# Patient Record
Sex: FEMALE | Race: WHITE
[De-identification: names, ages, dates, MRNs, and addresses within clinical notes are randomized per-mention and may not be internally consistent; named-entity substitution may affect disease eponyms.]

---

## 2019-04-23 ENCOUNTER — HOSPITAL ENCOUNTER (EMERGENCY)
Dept: HOSPITAL 4 - SED | Age: 54
Discharge: HOME | End: 2019-04-23
Payer: COMMERCIAL

## 2019-04-23 VITALS — BODY MASS INDEX: 18.61 KG/M2 | HEIGHT: 70 IN | WEIGHT: 130 LBS

## 2019-04-23 VITALS — SYSTOLIC BLOOD PRESSURE: 123 MMHG

## 2019-04-23 VITALS — SYSTOLIC BLOOD PRESSURE: 126 MMHG

## 2019-04-23 DIAGNOSIS — J32.9: Primary | ICD-10-CM

## 2019-04-23 DIAGNOSIS — Z90.89: ICD-10-CM

## 2019-04-23 DIAGNOSIS — N39.0: ICD-10-CM

## 2019-04-23 DIAGNOSIS — R56.9: ICD-10-CM

## 2019-04-23 DIAGNOSIS — Z87.891: ICD-10-CM

## 2019-04-23 DIAGNOSIS — M79.7: ICD-10-CM

## 2019-04-23 LAB
ALBUMIN SERPL BCP-MCNC: 4 G/DL (ref 3.4–4.8)
ALT SERPL W P-5'-P-CCNC: 29 U/L (ref 12–78)
AMPHETAMINES UR QL SCN: NEGATIVE
ANION GAP SERPL CALCULATED.3IONS-SCNC: 9 MMOL/L (ref 5–15)
APPEARANCE UR: CLEAR
AST SERPL W P-5'-P-CCNC: 22 U/L (ref 10–37)
BACTERIA URNS QL MICRO: (no result) /HPF
BARBITURATES UR QL SCN: NEGATIVE
BASOPHILS # BLD AUTO: 0 K/UL (ref 0–0.2)
BASOPHILS NFR BLD AUTO: 0.7 % (ref 0–2)
BENZODIAZ UR QL SCN: NEGATIVE
BILIRUB SERPL-MCNC: 0.5 MG/DL (ref 0–1)
BILIRUB UR QL STRIP: NEGATIVE
BUN SERPL-MCNC: 14 MG/DL (ref 8–21)
BZE UR QL SCN: NEGATIVE
CALCIUM SERPL-MCNC: 9.6 MG/DL (ref 8.4–11)
CANNABINOIDS UR QL SCN: POSITIVE
CHLORIDE SERPL-SCNC: 101 MMOL/L (ref 98–107)
COLOR UR: YELLOW
CREAT SERPL-MCNC: 0.86 MG/DL (ref 0.55–1.3)
EOSINOPHIL # BLD AUTO: 0.1 K/UL (ref 0–0.4)
EOSINOPHIL NFR BLD AUTO: 1.5 % (ref 0–4)
ERYTHROCYTE [DISTWIDTH] IN BLOOD BY AUTOMATED COUNT: 13.2 % (ref 9–15)
ETHANOL SERPL-MCNC: < 3 MG/DL (ref ?–10)
GFR SERPL CREATININE-BSD FRML MDRD: 88 ML/MIN (ref 90–?)
GLUCOSE SERPL-MCNC: 141 MG/DL (ref 70–99)
GLUCOSE UR STRIP-MCNC: NEGATIVE MG/DL
HCT VFR BLD AUTO: 39.9 % (ref 36–48)
HGB BLD-MCNC: 13.4 G/DL (ref 12–16)
HGB UR QL STRIP: NEGATIVE
INR PPP: 1.1 (ref 0.8–1.2)
KETONES UR STRIP-MCNC: NEGATIVE MG/DL
LEUKOCYTE ESTERASE UR QL STRIP: (no result)
LYMPHOCYTES # BLD AUTO: 1.7 K/UL (ref 1–5.5)
LYMPHOCYTES NFR BLD AUTO: 27.1 % (ref 20.5–51.5)
MCH RBC QN AUTO: 31 PG (ref 27–31)
MCHC RBC AUTO-ENTMCNC: 34 % (ref 32–36)
MCV RBC AUTO: 92 FL (ref 79–98)
METHADONE UR-SCNC: NEGATIVE UMOL/L
METHAMPHET UR-SCNC: NEGATIVE UMOL/L
MONOCYTES # BLD MANUAL: 0.5 K/UL (ref 0–1)
MONOCYTES # BLD MANUAL: 7.5 % (ref 1.7–9.3)
NEUTROPHILS # BLD AUTO: 3.9 K/UL (ref 1.8–7.7)
NEUTROPHILS NFR BLD AUTO: 63.2 % (ref 40–70)
NITRITE UR QL STRIP: NEGATIVE
OPIATES UR QL SCN: POSITIVE
OXYCODONE SERPL-MCNC: NEGATIVE NG/ML
PCP UR QL SCN: NEGATIVE
PH UR STRIP: 8 [PH] (ref 5–8)
PLATELET # BLD AUTO: 256 K/UL (ref 130–430)
POTASSIUM SERPL-SCNC: 4.4 MMOL/L (ref 3.5–5.1)
PROT UR QL STRIP: NEGATIVE
PROTHROMBIN TIME: 11.5 SECS (ref 9.5–12.5)
RBC # BLD AUTO: 4.35 MIL/UL (ref 4.2–6.2)
RBC #/AREA URNS HPF: (no result) /HPF (ref 0–3)
SODIUM SERPLBLD-SCNC: 137 MMOL/L (ref 136–145)
SP GR UR STRIP: 1.01 (ref 1–1.03)
TRICYCLICS UR-MCNC: POSITIVE NG/ML
URINE PROPOXYPHENE SCREEN: NEGATIVE
UROBILINOGEN UR STRIP-MCNC: 0.2 MG/DL (ref 0.2–1)
WBC # BLD AUTO: 6.2 K/UL (ref 4.8–10.8)
WBC #/AREA URNS HPF: (no result) /HPF (ref 0–3)

## 2019-04-23 PROCEDURE — 80053 COMPREHEN METABOLIC PANEL: CPT

## 2019-04-23 PROCEDURE — 85730 THROMBOPLASTIN TIME PARTIAL: CPT

## 2019-04-23 PROCEDURE — 85610 PROTHROMBIN TIME: CPT

## 2019-04-23 PROCEDURE — 84484 ASSAY OF TROPONIN QUANT: CPT

## 2019-04-23 PROCEDURE — 99284 EMERGENCY DEPT VISIT MOD MDM: CPT

## 2019-04-23 PROCEDURE — 70450 CT HEAD/BRAIN W/O DYE: CPT

## 2019-04-23 PROCEDURE — 36415 COLL VENOUS BLD VENIPUNCTURE: CPT

## 2019-04-23 PROCEDURE — 81000 URINALYSIS NONAUTO W/SCOPE: CPT

## 2019-04-23 PROCEDURE — 71045 X-RAY EXAM CHEST 1 VIEW: CPT

## 2019-04-23 PROCEDURE — 85025 COMPLETE CBC W/AUTO DIFF WBC: CPT

## 2019-04-23 PROCEDURE — 70551 MRI BRAIN STEM W/O DYE: CPT

## 2019-04-23 PROCEDURE — G0482 DRUG TEST DEF 15-21 CLASSES: HCPCS

## 2019-04-23 PROCEDURE — 87086 URINE CULTURE/COLONY COUNT: CPT

## 2019-04-23 PROCEDURE — 80307 DRUG TEST PRSMV CHEM ANLYZR: CPT

## 2019-04-23 PROCEDURE — 93005 ELECTROCARDIOGRAM TRACING: CPT

## 2019-04-23 NOTE — NUR
Patient to ER bed 4 to gown for evaluation. Place on cardiac monitor. Side 
rails up.

Report given to Wendy PENNY.

## 2019-04-23 NOTE — NUR
Patient given written and verbal discharge instructions and verbalizes 
understanding.  ER MD discussed with patient the results and treatment 
provided. Patient in stable condition. ID arm band removed. IV catheter removed 
intact and dressing applied, no active bleeding.

Rx of Tylenol, Cipro given. Patient educated on pain management and to follow 
up with PMD. Pain Scale 0/10.

Opportunity for questions provided and answered. Medication side effect fact 
sheet provided.

## 2019-04-23 NOTE — NUR
# 18 gauge angiocath placed to Left forearm.  Use of asceptic technique.  
Opsite placed over site.  Blood return noted.  Blood for lab drawn from site.  
Flushed with 10 cc of normal saline.  No evidence of infiltration noted.  
Patient tolerated well.

## 2019-04-23 NOTE — NUR
patient arrived AOx4 from home with c/o near syncope x yesterday. patient 
states when the event happen, she then went to sleep for 5 hours in the middle 
of the day. patient states she remembers hitting her head several times but did 
not come to  ER. patient has no currrent neuro def noted. patient present 
with a stable gait, bilateral strength to all extremities. patient stated she 
has had this happen before about 5 months ago and has not been worked up for 
this problem. no ther complaint or injury at this time.

## 2020-11-14 ENCOUNTER — HOSPITAL ENCOUNTER (INPATIENT)
Dept: HOSPITAL 4 - SED | Age: 55
LOS: 1 days | Discharge: HOME | DRG: 313 | End: 2020-11-15
Attending: INTERNAL MEDICINE | Admitting: INTERNAL MEDICINE
Payer: COMMERCIAL

## 2020-11-14 VITALS — HEIGHT: 70 IN | WEIGHT: 132 LBS | BODY MASS INDEX: 18.9 KG/M2

## 2020-11-14 VITALS — SYSTOLIC BLOOD PRESSURE: 105 MMHG

## 2020-11-14 VITALS — SYSTOLIC BLOOD PRESSURE: 130 MMHG

## 2020-11-14 VITALS — SYSTOLIC BLOOD PRESSURE: 133 MMHG

## 2020-11-14 DIAGNOSIS — Z79.82: ICD-10-CM

## 2020-11-14 DIAGNOSIS — J45.909: ICD-10-CM

## 2020-11-14 DIAGNOSIS — G47.00: ICD-10-CM

## 2020-11-14 DIAGNOSIS — I88.9: ICD-10-CM

## 2020-11-14 DIAGNOSIS — F41.9: ICD-10-CM

## 2020-11-14 DIAGNOSIS — R07.89: Primary | ICD-10-CM

## 2020-11-14 DIAGNOSIS — Z20.828: ICD-10-CM

## 2020-11-14 DIAGNOSIS — J31.0: ICD-10-CM

## 2020-11-14 DIAGNOSIS — Z79.899: ICD-10-CM

## 2020-11-14 DIAGNOSIS — E03.9: ICD-10-CM

## 2020-11-14 DIAGNOSIS — F32.9: ICD-10-CM

## 2020-11-14 LAB
ALBUMIN SERPL BCP-MCNC: 4.2 G/DL (ref 3.4–4.8)
ALT SERPL W P-5'-P-CCNC: 26 U/L (ref 12–78)
ANION GAP SERPL CALCULATED.3IONS-SCNC: 6 MMOL/L (ref 5–15)
AST SERPL W P-5'-P-CCNC: 24 U/L (ref 10–37)
BASOPHILS # BLD AUTO: 0.1 K/UL (ref 0–0.2)
BASOPHILS NFR BLD AUTO: 1.3 % (ref 0–2)
BILIRUB SERPL-MCNC: 0.5 MG/DL (ref 0–1)
BUN SERPL-MCNC: 10 MG/DL (ref 8–21)
CALCIUM SERPL-MCNC: 8.6 MG/DL (ref 8.4–11)
CHLORIDE SERPL-SCNC: 103 MMOL/L (ref 98–107)
CREAT SERPL-MCNC: 0.74 MG/DL (ref 0.55–1.3)
EOSINOPHIL # BLD AUTO: 0.2 K/UL (ref 0–0.4)
EOSINOPHIL NFR BLD AUTO: 2.5 % (ref 0–4)
ERYTHROCYTE [DISTWIDTH] IN BLOOD BY AUTOMATED COUNT: 13.3 % (ref 9–15)
GFR SERPL CREATININE-BSD FRML MDRD: 105 ML/MIN (ref 90–?)
GLUCOSE SERPL-MCNC: 97 MG/DL (ref 70–99)
HCT VFR BLD AUTO: 38.6 % (ref 36–48)
HGB BLD-MCNC: 13 G/DL (ref 12–16)
LIPASE SERPL-CCNC: 744 U/L (ref 73–393)
LYMPHOCYTES # BLD AUTO: 1.9 K/UL (ref 1–5.5)
LYMPHOCYTES NFR BLD AUTO: 26.3 % (ref 20.5–51.5)
MCH RBC QN AUTO: 31 PG (ref 27–31)
MCHC RBC AUTO-ENTMCNC: 34 % (ref 32–36)
MCV RBC AUTO: 91 FL (ref 79–98)
MONOCYTES # BLD MANUAL: 0.6 K/UL (ref 0–1)
MONOCYTES # BLD MANUAL: 7.7 % (ref 1.7–9.3)
NEUTROPHILS # BLD AUTO: 4.5 K/UL (ref 1.8–7.7)
NEUTROPHILS NFR BLD AUTO: 62.2 % (ref 40–70)
PLATELET # BLD AUTO: 284 K/UL (ref 130–430)
POTASSIUM SERPL-SCNC: 5 MMOL/L (ref 3.5–5.1)
RBC # BLD AUTO: 4.23 MIL/UL (ref 4.2–6.2)
SODIUM SERPLBLD-SCNC: 139 MMOL/L (ref 136–145)
WBC # BLD AUTO: 7.3 K/UL (ref 4.8–10.8)

## 2020-11-14 PROCEDURE — G0378 HOSPITAL OBSERVATION PER HR: HCPCS

## 2020-11-14 RX ADMIN — Medication SCH ML: at 21:43

## 2020-11-14 NOTE — NUR
Admission Note

Received patient from ER with diagnosis of chest pain R/O MI. Initial Plan of Care 
discussed-patient verbalized understanding. Family at bedside. Oriented to room, call light, 
pain management and safety.

## 2020-11-14 NOTE — NUR
CONSULTATION PAGED



REASON FOR CONSULTATION:CHEST PAIN R/O MI

WAS CONSULT CALLED?Y

PERSON WHO WAS NOTIFIED:ADRIAN

CONSULTING PHYSICIAN:KASSIDY LUJAN (CHARLENE SPRINGER ON CALL)

CONSULTANT SPECIALTY:CARDIO

CONSULTANT PHONE NUMBER:[phone]

ORDERING PHYSICIAN:AMERICA DAS

## 2020-11-14 NOTE — NUR
Patient came from home for evaluation of chest pain that has been intermittent 
for 2 weeks getting progressively worse over the past 2 days. Patient states 
the pain radiates to her left arm.

## 2020-11-14 NOTE — NUR
closing notes

pt awake, alert, and oriented, eating dinner in bed. brought pt's 2 vape juices, vape, and 
powder in thc container back to  to bring home. nonlabored breathing noted on room 
air. iv line intact and patent, no signs of infiltration noted. no acute distress noted. all 
needs met. call light in reach. fall and aspiration precautions in place. bed locked and in 
lowest position. will endorse to noc nurse including pt's additional belongings brought from

## 2020-11-14 NOTE — NUR
ROUTINE MEDS/ FLU VACCINE

ADMINISTERED ROUTINE MEDS AND FLU VACCINE AS ORDERED PER MD, EDUCATION GIVEN, TOLERATED 
WELL.

## 2020-11-14 NOTE — NUR
Placed in room 3. Placed on cardiac monitor, blood pressure machine and pulse 
oximeter. To gown for exam. Side rails up.

## 2020-11-14 NOTE — NUR
Transfer to [facility] via ACLS protocol. Licensed nurse present. IV present no signs 
or symptoms of infiltration.

## 2020-11-14 NOTE — NUR
Patient will be admitted to care of Dr. Varner.  Admitted to Telemetry unit.  
Will go to room 101B.  Belongings list completed.  Complete and up to date 
summary report printed. SBAR report to be given at bedside with opportunity for 
questions.

## 2020-11-14 NOTE — NUR
RN ROUNDS:



Patient is laying in bed and appears to be asleep.  She is not exhibiting any s/s of 
distress or discomfort.  will continue to monitor patient.

## 2020-11-14 NOTE — NUR
OPENING NOTES:



Received report from dayshift nurse.  Patient is alert and oriented x4.  She is on tele 
monitor and was admitted for chest pain.  Patient declined any pain at this time.  Ensured 
all safety precautions.  Bed is locked and in the lowest position, call light within reach.

## 2020-11-14 NOTE — NUR
MEDICATION ADMINISTRATION:



Patient is laying in bed with no s/s of distress or discomfort.  She is requesting her 
scheduled Ambien to help her sleep.  Medication administered as ordered.  Educated patient 
about the potential side effects of Ambien such as drowsiness and advised her not to get up 
to quickly and call for assistance if she feels drowsy.  Patient verbalized understanding.  
Will continue to monitor.

## 2020-11-15 VITALS — SYSTOLIC BLOOD PRESSURE: 94 MMHG

## 2020-11-15 VITALS — SYSTOLIC BLOOD PRESSURE: 96 MMHG

## 2020-11-15 VITALS — SYSTOLIC BLOOD PRESSURE: 104 MMHG

## 2020-11-15 LAB
ALBUMIN SERPL BCP-MCNC: 3.7 G/DL (ref 3.4–4.8)
ALT SERPL W P-5'-P-CCNC: 21 U/L (ref 12–78)
ANION GAP SERPL CALCULATED.3IONS-SCNC: 8 MMOL/L (ref 5–15)
AST SERPL W P-5'-P-CCNC: 21 U/L (ref 10–37)
BASOPHILS # BLD AUTO: 0 K/UL (ref 0–0.2)
BASOPHILS NFR BLD AUTO: 0.8 % (ref 0–2)
BILIRUB SERPL-MCNC: 0.4 MG/DL (ref 0–1)
BUN SERPL-MCNC: 10 MG/DL (ref 8–21)
CALCIUM SERPL-MCNC: 7.7 MG/DL (ref 8.4–11)
CHLORIDE SERPL-SCNC: 105 MMOL/L (ref 98–107)
CREAT SERPL-MCNC: 0.69 MG/DL (ref 0.55–1.3)
EOSINOPHIL # BLD AUTO: 0.2 K/UL (ref 0–0.4)
EOSINOPHIL NFR BLD AUTO: 3.6 % (ref 0–4)
ERYTHROCYTE [DISTWIDTH] IN BLOOD BY AUTOMATED COUNT: 13.3 % (ref 9–15)
GFR SERPL CREATININE-BSD FRML MDRD: 114 ML/MIN (ref 90–?)
GLUCOSE SERPL-MCNC: 97 MG/DL (ref 70–99)
HCT VFR BLD AUTO: 36.6 % (ref 36–48)
HGB BLD-MCNC: 12.5 G/DL (ref 12–16)
LIPASE SERPL-CCNC: 154 U/L (ref 73–393)
LYMPHOCYTES # BLD AUTO: 1.6 K/UL (ref 1–5.5)
LYMPHOCYTES NFR BLD AUTO: 26.4 % (ref 20.5–51.5)
MCH RBC QN AUTO: 31 PG (ref 27–31)
MCHC RBC AUTO-ENTMCNC: 34 % (ref 32–36)
MCV RBC AUTO: 91 FL (ref 79–98)
MONOCYTES # BLD MANUAL: 0.4 K/UL (ref 0–1)
MONOCYTES # BLD MANUAL: 7.4 % (ref 1.7–9.3)
NEUTROPHILS # BLD AUTO: 3.7 K/UL (ref 1.8–7.7)
NEUTROPHILS NFR BLD AUTO: 61.8 % (ref 40–70)
PLATELET # BLD AUTO: 255 K/UL (ref 130–430)
POTASSIUM SERPL-SCNC: 4 MMOL/L (ref 3.5–5.1)
RBC # BLD AUTO: 4.04 MIL/UL (ref 4.2–6.2)
SODIUM SERPLBLD-SCNC: 141 MMOL/L (ref 136–145)
WBC # BLD AUTO: 6 K/UL (ref 4.8–10.8)

## 2020-11-15 RX ADMIN — Medication SCH ML: at 06:20

## 2020-11-15 NOTE — NUR
CLOSING NOTES:



Patient is laying in bed and is currently asleep.  She is not exhibiting an s/s of distress 
or discomfort.  She did not have an complaints of chest pain throughout shift.  Patient has 
IV on right hand, patent and intact.  All needs were met throughout shift.  Ensured all 
safety precautions.  Bed is in the lowest position and call light within reach.  Will 
endorse to dayshift nurse.

## 2020-11-15 NOTE — NUR
D/C Patient

Patient given medication reconciliation form and D/C instructions. Exit Care provided. 
Patient verbalized understanding. MD discussed with patient the results and treatment 
provided. Ambulatory with steady gait for discharge to home. Patient in stable condition, ID 
band removed. IV catheter removed, intact and dressing applied, no active bleeding. Rx of 
cephalexin given. Patient educated on pain management. All belongings sent with patient. No

## 2020-11-15 NOTE — NUR
MD VISIT/CARDIO

DR JEFFERY AT BED SIDE TALKING TO PT. PT IS CLEAR FOR DISCHARGE AS PER DR JEFFERY. PT TO 
FOLLOW UP WITH DR SHRESTHA AND DR MILLER FOR STRESS TEST AS OUT PT.

## 2020-11-15 NOTE — NUR
RN ROUNDS:



Patient is laying in bed and appears to be asleep.  She is in stable condition.  Will 
continue to monitor.

## 2020-11-15 NOTE — NUR
INITIAL NOTE

RECEIVED PT IN BED, NO S/S OF DISTRESS OR SOB NOTED, PT HAS NO C/O PAIN AT THIS TIME, PT IN 
STABLE CONDITION. PT AAOX4,  VERBAL. IV CATHETER PATENT, NO SIGNS OF INFECTION OR 
INFILTRATION NOTED. BED AT LOWEST POSITION, CALL LIGHT WITHIN REACH, WILL CONTINUE TO 
MONITOR PT FOR ANY CHANGES.

## 2020-11-15 NOTE — NUR
RN ROUNDS:



Patient is laying in bed with no s/s of distress or discomfort.  Will continue to monitor.

## 2020-11-15 NOTE — NUR
RN ROUNDS:



Patient is laying in bed and continues to be asleep.  She has unlabored breathing on room 
air and is tolerating well.  Bed is in the lowest position with alarm on.  Will continue to 
monitor.